# Patient Record
Sex: MALE | Race: WHITE | NOT HISPANIC OR LATINO | URBAN - METROPOLITAN AREA
[De-identification: names, ages, dates, MRNs, and addresses within clinical notes are randomized per-mention and may not be internally consistent; named-entity substitution may affect disease eponyms.]

---

## 2018-10-13 ENCOUNTER — EMERGENCY (EMERGENCY)
Facility: HOSPITAL | Age: 28
LOS: 0 days | Discharge: HOME | End: 2018-10-13
Attending: EMERGENCY MEDICINE | Admitting: EMERGENCY MEDICINE

## 2018-10-13 VITALS
RESPIRATION RATE: 16 BRPM | HEART RATE: 73 BPM | WEIGHT: 169.98 LBS | SYSTOLIC BLOOD PRESSURE: 160 MMHG | HEIGHT: 70 IN | OXYGEN SATURATION: 99 % | TEMPERATURE: 98 F | DIASTOLIC BLOOD PRESSURE: 86 MMHG

## 2018-10-13 DIAGNOSIS — J45.909 UNSPECIFIED ASTHMA, UNCOMPLICATED: ICD-10-CM

## 2018-10-13 DIAGNOSIS — R79.9 ABNORMAL FINDING OF BLOOD CHEMISTRY, UNSPECIFIED: ICD-10-CM

## 2018-10-13 DIAGNOSIS — K52.9 NONINFECTIVE GASTROENTERITIS AND COLITIS, UNSPECIFIED: ICD-10-CM

## 2018-10-13 DIAGNOSIS — Z88.0 ALLERGY STATUS TO PENICILLIN: ICD-10-CM

## 2018-10-13 LAB
ALBUMIN SERPL ELPH-MCNC: 4.1 G/DL — SIGNIFICANT CHANGE UP (ref 3.5–5.2)
ALP SERPL-CCNC: 77 U/L — SIGNIFICANT CHANGE UP (ref 30–115)
ALT FLD-CCNC: 45 U/L — HIGH (ref 0–41)
ANION GAP SERPL CALC-SCNC: 12 MMOL/L — SIGNIFICANT CHANGE UP (ref 7–14)
AST SERPL-CCNC: 25 U/L — SIGNIFICANT CHANGE UP (ref 0–41)
BASOPHILS # BLD AUTO: 0.06 K/UL — SIGNIFICANT CHANGE UP (ref 0–0.2)
BASOPHILS NFR BLD AUTO: 0.5 % — SIGNIFICANT CHANGE UP (ref 0–1)
BILIRUB SERPL-MCNC: 0.6 MG/DL — SIGNIFICANT CHANGE UP (ref 0.2–1.2)
BUN SERPL-MCNC: 11 MG/DL — SIGNIFICANT CHANGE UP (ref 10–20)
CALCIUM SERPL-MCNC: 9.3 MG/DL — SIGNIFICANT CHANGE UP (ref 8.5–10.1)
CHLORIDE SERPL-SCNC: 102 MMOL/L — SIGNIFICANT CHANGE UP (ref 98–110)
CO2 SERPL-SCNC: 26 MMOL/L — SIGNIFICANT CHANGE UP (ref 17–32)
CREAT SERPL-MCNC: 1 MG/DL — SIGNIFICANT CHANGE UP (ref 0.7–1.5)
EOSINOPHIL # BLD AUTO: 0.35 K/UL — SIGNIFICANT CHANGE UP (ref 0–0.7)
EOSINOPHIL NFR BLD AUTO: 3.1 % — SIGNIFICANT CHANGE UP (ref 0–8)
GLUCOSE SERPL-MCNC: 100 MG/DL — HIGH (ref 70–99)
HCT VFR BLD CALC: 44.2 % — SIGNIFICANT CHANGE UP (ref 42–52)
HGB BLD-MCNC: 14.6 G/DL — SIGNIFICANT CHANGE UP (ref 14–18)
HIV 1 & 2 AB SERPL IA.RAPID: SIGNIFICANT CHANGE UP
IMM GRANULOCYTES NFR BLD AUTO: 0.8 % — HIGH (ref 0.1–0.3)
LACTATE SERPL-SCNC: 0.7 MMOL/L — SIGNIFICANT CHANGE UP (ref 0.5–2.2)
LYMPHOCYTES # BLD AUTO: 1.84 K/UL — SIGNIFICANT CHANGE UP (ref 1.2–3.4)
LYMPHOCYTES # BLD AUTO: 16.2 % — LOW (ref 20.5–51.1)
MAGNESIUM SERPL-MCNC: 2.1 MG/DL — SIGNIFICANT CHANGE UP (ref 1.8–2.4)
MCHC RBC-ENTMCNC: 28.2 PG — SIGNIFICANT CHANGE UP (ref 27–31)
MCHC RBC-ENTMCNC: 33 G/DL — SIGNIFICANT CHANGE UP (ref 32–37)
MCV RBC AUTO: 85.5 FL — SIGNIFICANT CHANGE UP (ref 80–94)
MONOCYTES # BLD AUTO: 0.76 K/UL — HIGH (ref 0.1–0.6)
MONOCYTES NFR BLD AUTO: 6.7 % — SIGNIFICANT CHANGE UP (ref 1.7–9.3)
NEUTROPHILS # BLD AUTO: 8.29 K/UL — HIGH (ref 1.4–6.5)
NEUTROPHILS NFR BLD AUTO: 72.7 % — SIGNIFICANT CHANGE UP (ref 42.2–75.2)
NRBC # BLD: 0 /100 WBCS — SIGNIFICANT CHANGE UP (ref 0–0)
PLATELET # BLD AUTO: 457 K/UL — HIGH (ref 130–400)
POTASSIUM SERPL-MCNC: 4.5 MMOL/L — SIGNIFICANT CHANGE UP (ref 3.5–5)
POTASSIUM SERPL-SCNC: 4.5 MMOL/L — SIGNIFICANT CHANGE UP (ref 3.5–5)
PROT SERPL-MCNC: 7.3 G/DL — SIGNIFICANT CHANGE UP (ref 6–8)
RBC # BLD: 5.17 M/UL — SIGNIFICANT CHANGE UP (ref 4.7–6.1)
RBC # FLD: 12.5 % — SIGNIFICANT CHANGE UP (ref 11.5–14.5)
SODIUM SERPL-SCNC: 140 MMOL/L — SIGNIFICANT CHANGE UP (ref 135–146)
WBC # BLD: 11.39 K/UL — HIGH (ref 4.8–10.8)
WBC # FLD AUTO: 11.39 K/UL — HIGH (ref 4.8–10.8)

## 2018-10-13 RX ORDER — SODIUM CHLORIDE 9 MG/ML
1000 INJECTION INTRAMUSCULAR; INTRAVENOUS; SUBCUTANEOUS ONCE
Qty: 0 | Refills: 0 | Status: COMPLETED | OUTPATIENT
Start: 2018-10-13 | End: 2018-10-13

## 2018-10-13 RX ORDER — SODIUM CHLORIDE 9 MG/ML
1000 INJECTION INTRAMUSCULAR; INTRAVENOUS; SUBCUTANEOUS
Qty: 0 | Refills: 0 | Status: DISCONTINUED | OUTPATIENT
Start: 2018-10-13 | End: 2018-10-13

## 2018-10-13 RX ORDER — IOHEXOL 300 MG/ML
30 INJECTION, SOLUTION INTRAVENOUS ONCE
Qty: 0 | Refills: 0 | Status: COMPLETED | OUTPATIENT
Start: 2018-10-13 | End: 2018-10-13

## 2018-10-13 RX ORDER — SODIUM CHLORIDE 9 MG/ML
3 INJECTION INTRAMUSCULAR; INTRAVENOUS; SUBCUTANEOUS ONCE
Qty: 0 | Refills: 0 | Status: COMPLETED | OUTPATIENT
Start: 2018-10-13 | End: 2018-10-13

## 2018-10-13 RX ADMIN — SODIUM CHLORIDE 1000 MILLILITER(S): 9 INJECTION INTRAMUSCULAR; INTRAVENOUS; SUBCUTANEOUS at 13:40

## 2018-10-13 RX ADMIN — SODIUM CHLORIDE 125 MILLILITER(S): 9 INJECTION INTRAMUSCULAR; INTRAVENOUS; SUBCUTANEOUS at 15:47

## 2018-10-13 RX ADMIN — IOHEXOL 30 MILLILITER(S): 300 INJECTION, SOLUTION INTRAVENOUS at 13:54

## 2018-10-13 RX ADMIN — SODIUM CHLORIDE 1000 MILLILITER(S): 9 INJECTION INTRAMUSCULAR; INTRAVENOUS; SUBCUTANEOUS at 15:47

## 2018-10-13 RX ADMIN — SODIUM CHLORIDE 3 MILLILITER(S): 9 INJECTION INTRAMUSCULAR; INTRAVENOUS; SUBCUTANEOUS at 13:40

## 2018-10-13 NOTE — ED PROVIDER NOTE - MEDICAL DECISION MAKING DETAILS
Ct demonstrates ileitis. No sign of systemic infection. suitable for outpatient management. [facility]As gi follow up. Feels and appears well. No complaints at present. Eager to leave. Stable for discharge. Patient was given strict return and follow up precautions. The patient has been informed of all concerning signs and symptoms to return to Emergency Department, the necessity to follow up with PMD/Clinic/follow up provided within 2-3 days was explained, and the patient reports understanding of above with capacity and insight.

## 2018-10-13 NOTE — ED ADULT NURSE NOTE - NSIMPLEMENTINTERV_GEN_ALL_ED
Implemented All Universal Safety Interventions:  [place] to call system. Call bell, personal items and telephone within reach. Instruct patient to call for assistance. Room bathroom lighting operational. Non-slip footwear when patient is off stretcher. Physically safe environment: no spills, clutter or unnecessary equipment. Stretcher in lowest position, wheels locked, appropriate side rails in place.

## 2018-10-13 NOTE — ED PROVIDER NOTE - ATTENDING CONTRIBUTION TO CARE
28M otherwise healthy. 1 mo ago hurt back while exercising right lower. Also developed three episodes of watery stool per day since that time. no formed stools in between. No travel, weight loss, fevers, swollen glands, brbpr, abd pain, n/v, no difficulty urinating, no recent travel, no family hx of thyroid abnormalities, gastric ca, autoimmune disease, ibd. Was using multiple supplements (probiotic, vitamins at time of onset but d/c'd prior to onset of symptoms).   Seen by GI and referred to ED for wbc found to be greater than 20k. VITAL SIGNS: noted  CONSTITUTIONAL: Well-developed; well-nourished; in no acute distress  HEAD: Normocephalic; atraumatic  EYES: PERRL, EOM intact; conjunctiva and sclera clear  ENT: No nasal discharge; airway clear. MMM  NECK: Supple; non tender. No anterior cervical lymphadenopathy noted  CARD: S1, S2 normal; no murmurs, gallops, or rubs. Regular rate and rhythm  RESP: CTAB/L, no wheezes, rales or rhonchi  ABD: Normal bowel sounds; soft; non-distended; non-tender; no hepatosplenomegaly. No CVA tenderness  EXT: Normal ROM. No calf tenderness or edema. Distal pulses intact  NEURO: Alert, oriented. Grossly unremarkable. No focal deficits  SKIN: Skin exam is warm and dry, no acute rash  Will obtain labs, ctap w/ po iv, give ivf and reassess.

## 2018-10-13 NOTE — ED PROVIDER NOTE - OBJECTIVE STATEMENT
28 year old male states that he has had diarrhea for the last month and states that he was seen by PMD and GI dr. marina who preformed blood test and his WBC was 20. patient was sent in for PO/IV CT scan to rule out IBD. patient denies shortness of breath, abd pain, nausea and vomiting

## 2020-08-16 ENCOUNTER — EMERGENCY (EMERGENCY)
Facility: HOSPITAL | Age: 30
LOS: 0 days | Discharge: LEFT BEFORE DISCHARGE | End: 2020-08-16
Attending: STUDENT IN AN ORGANIZED HEALTH CARE EDUCATION/TRAINING PROGRAM
Payer: COMMERCIAL

## 2020-08-16 VITALS
SYSTOLIC BLOOD PRESSURE: 121 MMHG | RESPIRATION RATE: 16 BRPM | HEART RATE: 66 BPM | WEIGHT: 175.05 LBS | TEMPERATURE: 98 F | DIASTOLIC BLOOD PRESSURE: 78 MMHG | OXYGEN SATURATION: 99 %

## 2020-08-16 DIAGNOSIS — R42 DIZZINESS AND GIDDINESS: ICD-10-CM

## 2020-08-16 DIAGNOSIS — Z88.0 ALLERGY STATUS TO PENICILLIN: ICD-10-CM

## 2020-08-16 DIAGNOSIS — R06.02 SHORTNESS OF BREATH: ICD-10-CM

## 2020-08-16 PROBLEM — J45.909 UNSPECIFIED ASTHMA, UNCOMPLICATED: Chronic | Status: ACTIVE | Noted: 2018-10-13

## 2020-08-16 PROCEDURE — 99284 EMERGENCY DEPT VISIT MOD MDM: CPT

## 2020-08-16 RX ORDER — NITROGLYCERIN 6.5 MG
0.4 CAPSULE, EXTENDED RELEASE ORAL
Refills: 0 | Status: DISCONTINUED | OUTPATIENT
Start: 2020-08-16 | End: 2020-08-16

## 2020-08-16 NOTE — ED ADULT NURSE REASSESSMENT NOTE - NS ED NURSE REASSESS COMMENT FT1
patient requested to leave AMA. Patient refused blood work.  MD aware.  Patient A&Ox4, ambulates with a steady gait.  Patient signed all AMA papers, has no IV access. MD aware.  Patient in stable condition and nad.

## 2020-08-16 NOTE — ED ADULT NURSE NOTE - OBJECTIVE STATEMENT
patient complaints he felt dizziness after taking Nyquil tonight. Patient denies chest pain, n/v, no fever and no SOB.  Patient denies dizziness at this time. Patient reports feeling sleepy now.

## 2020-08-16 NOTE — ED PROVIDER NOTE - PATIENT PORTAL LINK FT
You can access the FollowMyHealth Patient Portal offered by [facility] by registering at the following website: [email or web address]. By joining [employer]’s FollowMyHealth portal, you will also be able to view your health information using other applications (apps) compatible with our system.

## 2020-08-16 NOTE — ED PROVIDER NOTE - PROGRESS NOTE DETAILS
The patient has decided to leave against medical advice. He does not want any EKG or bloodwork. He is aware of the risks including death, permanent disability, or recurruence/worsening of sxs. The patient is AAOx3, not intoxicated, and displays normal decision making ability. We discussed all risks, benefits, and alternatives to the progression of treatment and the potential dangers of leaving including but not limited to permanent disability, injury, and death.  The patient was instructed that they are welcome to change their decision to leave against medical advice and return to the emergency department at any time and for any reason in order to allow us to render care. Pt is accompanied by mother and both are aware that they can return at any time.

## 2020-08-16 NOTE — ED PROVIDER NOTE - CLINICAL SUMMARY MEDICAL DECISION MAKING FREE TEXT BOX
30 yr old m that presents with nausea, vomiting, abdominal pain, dizziness s/p nyquil and marijuana. Pt refusing workup. Will leave AMA.

## 2020-08-16 NOTE — ED PROVIDER NOTE - OBJECTIVE STATEMENT
30M no PMH p/w dizziness and lightheadedness after taking nyquil and smoking marijuana. Onset 1.5 hours ago after taking nyquil. His father had CP and tried picking him up and felt dizzy and lightheaded. Admits to some nausea. Denies tunnel vision, Cp, SOB, palpitaitons, vomiting. Sxs resolved after laying down on couch.

## 2020-08-16 NOTE — ED PROVIDER NOTE - NSFOLLOWUPCLINICS_GEN_ALL_ED_FT
[facility]  Medicine  [address], [place]   Phone: [phone]  Fax:   Follow Up Time: 1-3 Days

## 2020-08-16 NOTE — ED PROVIDER NOTE - NS ED ROS FT
General: Lightheadedness. No fever or chills.   Eyes:  No visual changes, eye pain or discharge.  ENMT:  No hearing changes, pain, no sore throat or runny nose, no difficulty swallowing  Cardiac:  No chest pain, SOB or edema. No chest pain with exertion.  Respiratory:  No cough or respiratory distress. No hemoptysis. No history of asthma or RAD.  GI:  No nausea, vomiting, diarrhea or abdominal pain.  :  No dysuria, frequency or burning.  MS:  No myalgia, muscle weakness, joint pain or back pain.  Neuro: Dizziness. No HA. No unilateral extremity weakness or numbness.   Skin:  No skin rash.

## 2020-08-16 NOTE — ED ADULT NURSE NOTE - CHPI ED NUR SYMPTOMS NEG
no decreased eating/drinking/no pain/no weakness/no nausea/no fever/no tingling/no chills/no dizziness/no vomiting

## 2020-08-16 NOTE — ED PROVIDER NOTE - ATTENDING CONTRIBUTION TO CARE
30 yr old m w/ no pmh who presents today with nausea, vomiting and dizziness s/p nyquil. Pt states that this evening he took nyquil and also marijuana before going to bed. However, his father was complaining of SOB and chest pain. Pt states that he was advised by EMS to come for evaluation. Pt denies any other medical complaints. Pt denies HI and SI     Review of Systems    Constitutional: (-) fever  Cardiovascular: (-) chest pain, (-) syncope  Respiratory: (-) cough, (-) shortness of breath  Gastrointestinal: (-) vomiting, (-) diarrhea, (-) abdominal pain  Musculoskeletal: (-) neck pain, (-) back pain, (-) joint pain  Integumentary: (-) rash, (-) edema  Neurological: (-) headache, (-) altered mental status    Except as documented in the HPI, all other systems are negative.    VITAL SIGNS: I have reviewed nursing notes and confirm.  CONSTITUTIONAL: non-toxic, well appearing  SKIN: no rash, no petechiae.  EYES: PERRL, EOMI, pink conjunctiva, anicteric  ENT: tongue midline, no exudates, MMM  NECK: Supple; no meningismus, no JVD  CARD: RRR, no murmurs, equal radial pulses bilaterally 2+  RESP: CTAB, no respiratory distress  ABD: Soft, non-tender, non-distended, no peritoneal signs, no HSM, no CVA tenderness  EXT: Normal ROM x4. No edema. No calves tenderness  NEURO: Alert, oriented. CN2-12 intact, equal strength bilaterally, nl gait.  PSYCH: Cooperative, appropriate.    a/p  30 yr old m that presents with abdominal pain and dizziness after taking nyquil and marijuana. Pt advised that our recommendation would be to obtain labs and complete evaluation. Pt refused labs and stated that he would like to leave AMA. Pt explained the consequences of leaving AMA and pt verbalized understanding, and stated that he would return if worsening symptoms.

## 2025-01-20 PROBLEM — Z00.00 ENCOUNTER FOR PREVENTIVE HEALTH EXAMINATION: Status: ACTIVE | Noted: 2025-01-20

## 2025-02-07 ENCOUNTER — APPOINTMENT (OUTPATIENT)
Dept: UROLOGY | Facility: CLINIC | Age: 35
End: 2025-02-07
Payer: COMMERCIAL

## 2025-02-07 VITALS
SYSTOLIC BLOOD PRESSURE: 143 MMHG | RESPIRATION RATE: 18 BRPM | BODY MASS INDEX: 27.92 KG/M2 | HEIGHT: 70 IN | DIASTOLIC BLOOD PRESSURE: 95 MMHG | OXYGEN SATURATION: 99 % | WEIGHT: 195 LBS | HEART RATE: 70 BPM

## 2025-02-07 DIAGNOSIS — F17.200 NICOTINE DEPENDENCE, UNSPECIFIED, UNCOMPLICATED: ICD-10-CM

## 2025-02-07 DIAGNOSIS — Z78.9 OTHER SPECIFIED HEALTH STATUS: ICD-10-CM

## 2025-02-07 DIAGNOSIS — N52.9 MALE ERECTILE DYSFUNCTION, UNSPECIFIED: ICD-10-CM

## 2025-02-07 DIAGNOSIS — Z87.09 PERSONAL HISTORY OF OTHER DISEASES OF THE RESPIRATORY SYSTEM: ICD-10-CM

## 2025-02-07 DIAGNOSIS — N48.1 BALANITIS: ICD-10-CM

## 2025-02-07 PROCEDURE — 99204 OFFICE O/P NEW MOD 45 MIN: CPT

## 2025-02-07 RX ORDER — SILDENAFIL 100 MG/1
100 TABLET, FILM COATED ORAL
Qty: 6 | Refills: 5 | Status: ACTIVE | COMMUNITY
Start: 2025-02-07 | End: 1900-01-01

## 2025-02-07 RX ORDER — NYSTATIN AND TRIAMCINOLONE ACETONIDE 100000; 1 MG/G; MG/G
100000-0.1 CREAM TOPICAL
Qty: 60 | Refills: 4 | Status: ACTIVE | COMMUNITY
Start: 2025-02-07 | End: 1900-01-01

## 2025-03-28 ENCOUNTER — APPOINTMENT (OUTPATIENT)
Dept: UROLOGY | Facility: CLINIC | Age: 35
End: 2025-03-28

## 2025-05-30 ENCOUNTER — APPOINTMENT (OUTPATIENT)
Dept: UROLOGY | Facility: CLINIC | Age: 35
End: 2025-05-30
Payer: COMMERCIAL

## 2025-05-30 DIAGNOSIS — N52.9 MALE ERECTILE DYSFUNCTION, UNSPECIFIED: ICD-10-CM

## 2025-05-30 PROCEDURE — 99214 OFFICE O/P EST MOD 30 MIN: CPT

## 2025-07-18 ENCOUNTER — APPOINTMENT (OUTPATIENT)
Dept: UROLOGY | Facility: CLINIC | Age: 35
End: 2025-07-18